# Patient Record
Sex: MALE | URBAN - METROPOLITAN AREA
[De-identification: names, ages, dates, MRNs, and addresses within clinical notes are randomized per-mention and may not be internally consistent; named-entity substitution may affect disease eponyms.]

---

## 2020-03-13 ENCOUNTER — VIRTUAL VISIT (OUTPATIENT)
Dept: FAMILY MEDICINE | Facility: OTHER | Age: 30
End: 2020-03-13

## 2020-03-14 ENCOUNTER — VIRTUAL VISIT (OUTPATIENT)
Dept: FAMILY MEDICINE | Facility: OTHER | Age: 30
End: 2020-03-14

## 2020-03-18 NOTE — PROGRESS NOTES
"Date: 2020 19:38:11  Clinician: Grace Dang  Clinician NPI: [id number]  Patient: Garrett Andrews  Patient : 1990  Patient Address: [address] [place], [place] [address]  Patient Phone: [phone]  Visit Protocol: Ear pain  Patient Summary:  Garrett is a 29 year old ( : 1990 ) male who initiated a Visit for swimmer's ear (ear pain). When asked the question \"Please sign me up to receive news, health information and promotions from [employer].\", Garrett responded \"No\".    Garrett uploaded images of his ear(s).   Garrett reports that his ear pain started 2-4 days ago. The ear pain is located inside the left ear.   In addition to the ear pain, Garrett is experiencing a feeling of fullness in the ear(s). He feels feverish.   Symptom Details     Pain: Garrett is experiencing moderate pain (4-6 on a 10 point pain scale). It does not get worse when he gently pulls on the earlobe(s) and eats or chews.     Temperature: His current temperature is 99.1 degrees Fahrenheit.      Garrett denies tenderness, redness, and itchiness in the ear(s). He also denies recent injuries near the ear(s), having fluid draining from the ear(s), ever having ear tubes, and the possibility of a foreign object in the ear(s).   Precipitating events   Garrett denies swimming and flying within the past week.   Pertinent medical history   Weight: 205 lbs   He does not smoke or use smokeless tobacco.   Additional health information pertinent to this Visit as reported by the patient (free text): Recent hx of otitis media (three in past 12 months).  Fever has come and gone over past five days, usually only lasting a few hours before receding.  Coincident with apparent common cold or possible flu.  Quite a bit of fluid seems built up inside ear.   Weight: 205 lbs  A synchronous phone visit was initiated by the provider for the following reason: patient showing pictures of his ear and facial skin - unclear if he is indicating cellulitis " "   MEDICATIONS: Vitamin D3 oral, fish oil-dha-epa oral, elderberry fruit-elderberry flower oral, ALLERGIES: Sulfa (Sulfonamide Antibiotics)  Clinician Response:  Dear Garrett Tucker,  I am sorry that you are not feeling well.  Based on your history of recurrent middle ear infections, I have prescribed the antibiotic amoxicillin to be taken twice a day for 10 days.&nbsp; You can start this right away.&nbsp;&nbsp;   You can use ibuprofen 400-600 mg up to every 6 hours for pain relief.  If your symptoms do not improve within 48 hours, please be sen in a clinic.  I advise that you speak with your primary care provider about having an ENT (ear, nose, throat) consultation due to the frequent recurrences of inner ear infections.  Please take care and feel better    Diagnosis: Acute serous otitis media, left ear  Diagnosis ICD: H65.02  Triage Notes: I reviewed the patient's history, verified their identity, and explained the Visit process.    Called patient to review photo which was not clear.  He denies redness, warmth or swelling of the skin of the ear or around the ear.  His symptoms are a \"carbon copy\" of his previously diagnosed middle ear infections.  He has successfully treated these with amoxicillin.  Synchronous Triage: phone, status: completed, duration: 128 seconds  Prescription: amoxicillin 875 mg oral tablet 20 tablet, 10 days supply. Take 1 tablet by mouth every 12 hours for 10 days. Refills: 0, Refill as needed: no, Allow substitutions: yes  Pharmacy: [id number] - [phone] - [address] [place] [address]  "

## 2020-03-18 NOTE — PROGRESS NOTES
"Date: 2020 16:01:13  Clinician: July Gotti  Clinician NPI: [id number]  Patient: Garrett Andrews  Patient : 1990  Patient Address: [address] [place], [place] [address]  Patient Phone: [phone]  Visit Protocol: URI  Patient Summary:  Garrett is a 29 year old ( : 1990 ) male who initiated a Visit for COVID-19 (Coronavirus) evaluation and screening. When asked the question \"Please sign me up to receive news, health information and promotions from [employer].\", Garrett responded \"No\".    Garrett states his symptoms started suddenly 3-6 days ago.   His symptoms consist of a cough, nasal congestion, ear pain, a headache, and rhinitis.   Symptom details     Nasal secretions: The color of his mucus is clear, yellow, and white.    Cough: Garrett coughs every 5-10 minutes and his cough is more bothersome at night. Phlegm comes into his throat when he coughs. He does not believe his cough is caused by post-nasal drip. The color of the phlegm is yellow and white.     Headache: He states the headache is mild (1-3 on a 10 point pain scale).      Garrett denies having chills, wheezing, sore throat, fever, teeth pain, malaise, facial pain or pressure, and myalgias. He also denies taking antibiotic medication for the symptoms, having recent facial or sinus surgery in the past 60 days, and double sickening (worsening symptoms after initial improvement). He is not experiencing dyspnea.   Precipitating events  He has not recently been exposed to someone with influenza. Garrett has been in close contact with the following high risk individuals: children under the age of 5.   Pertinent COVID-19 (Coronavirus) information  Garrett has traveled internationally or to the areas where COVID-19 (Coronavirus) is widespread in the last 14 days before the start of his symptoms. Countries or locations traveled as reported by the patient (free text): [place], [place] -- One of the original outbreak areas in the [place]   " Garrett has not had close contact with a laboratory-confirmed positive COVID-19 patient or someone under quarantine for suspected COVID-19 within 14 days of symptom onset.   Garrett is not a healthcare worker or does not work in a healthcare facility.   Pertinent medical history  Garrett had 1 sinus infection within the past year.   Garrett does not need a return to work/school note.   Weight: 205 lbs   Garrett does not smoke or use smokeless tobacco.   Additional information as reported by the patient (free text): Anecdotal history of breathing issues from young age, possible asthma; history of bronchitis   Weight: 205 lbs    MEDICATIONS: Vitamin D3 oral, fish oil-dha-epa oral, elderberry fruit-elderberry flower oral, ALLERGIES: Sulfa (Sulfonamide Antibiotics)  Clinician Response:  Dear Garrett,   Dear Garrett,  Based on the information you have provided, it does not appear you need Coronavirus (COVID-19) testing at this time because you do not have both a fever and cough which are the primary symptoms of Coronavirus. But your possible exposure to Coronavirus in [place] means that we do recommend self-isolation for 14 days from the last day you may have been exposed.   What does this mean?  Isolate Yourself:   Isolate yourself at home.   Do Not allow any visitors  Do Not go to work or school  Do Not go to [facility],  centers, shopping, or other public places.  Do Not shake hands.  Avoid close contact with others (hugging, kissing).   Protect Others:   Cover Your Mouth and Nose with a mask, disposable tissue or wash cloth to avoid spreading germs to others.  Wash your hands and face frequently with soap and water.   If you have not developed a cough with fever by day 15 of isolation you are considered uninfected.  **If you develop a fever, submit a new visit to us so we can arrange curbside COVID testing.**  Thank you for limiting contact with others, wearing a simple mask to cover your cough, practice  good hand hygiene habits and accessing our virtual services where possible to limit the spread of this virus.  For more information about COVID19 and options for caring for yourself at home, please visit the CDC website at https://www.cdc.gov/coronavirus/2019-ncov/about/steps-when-sick.html  For more options for care at [facility], please visit our website at [email or web address]    COVID-19 (Coronavirus) General Information  With the increase in the number of COVID-19 (Coronavirus) cases, we understand you may have some questions. Below is some helpful information on COVID-19 (Coronavirus).  How can I protect myself and others from the COVID-19 (Coronavirus)?  Because there is currently no vaccine to prevent infection, the best way to protect yourself is to avoid being exposed to this virus. Put distance between yourself and other people if COVID-19 (Coronavirus) is spreading in your community. The virus is thought to spread mainly from person-to-person.     Between people who are in close contact with one another (within about 6 about) for prolonged period (10 minutes or longer).    Through respiratory droplets produced when an infected person coughs or sneezes.     The CDC recommends the following additional steps to protect yourself and others:     Wash your hands often with soap and water for at least 20 seconds, especially after blowing your nose, coughing, or sneezing; going to the bathroom; and before eating or preparing food.  Use an alcohol-based hand  that contains at least 60 percent alcohol if soap and water are not available.        Avoid touching your eyes, nose and mouth with unwashed hands.    Avoid close contact with people who are sick.    Stay home when you are sick.    Cover your cough or sneeze with a tissue, then throw the tissue in the trash.    Clean and disinfect frequently touched objects and surfaces.     You can help stop COVID-19 (Coronavirus)  by knowing the signs and symptoms:     Fever    Cough    Shortness of breath     Contact your healthcare provider if   Develop symptoms   AND   Have been in close contact with a person known to have COVID-19 (Coronavirus) or live in or have recently traveled from an area with ongoing spread of COVID-19 (Coronavirus). Call ahead before you go to a doctor's office or emergency room. Tell them about your recent travel and your symptoms.   For the most up to date information, visit the CDC's website.  Steps to help prevent the spread of COVID-19 (Coronavirus) if you are sick  If you are sick with COVID-19 (Coronavirus) or suspect you are infected with the virus that causes COVID-19 (Coronavirus), follow the steps below to help prevent the disease from spreading&nbsp;to people in your home and community.     Stay home except to get medical care. Home isolation may be started in consultation with your healthcare clinician.    Separate yourself from other people and animals in your home.    Call ahead before visiting your doctor if you have a medical appointment.    Wear a facemask when you are around other people.    Cover your cough and sneezes.    Clean your hands often.    Avoid sharing personal household items.    Clean and disinfect frequently touched objects and surfaces everyday.    You will need to have someone drop off medications or household supplies (if needed) at your house without coming inside or in contact with you or others living in your house.    Monitor your symptoms and seek prompt medical care if your illness is worsening (e.g. Difficulty breathing).    Discontinue home isolation only in consultation with your healthcare provider.     For more detailed and up to date information on what to do if you are sick, visit this link: What to Do If You Are Sick With Coronavirus Disease 2019 (COVID-19).  Do I need to be tested for COVID-19 (Coronavirus)?     At this time, the limited number of tests available  "are controlled by the state and local health departments and are being reserved for more seriously ill patients, those with known exposure to confirmed patients, and those with recent travel (within 14 days) to countries with high rates of COVID-19 (Coronavirus).    Decisions on which patients receive testing will be based on the local spread of COVID-19 (Coronavirus) as well as the symptoms. Your healthcare provider will make the final decision on whether you should be tested.    In the meantime, if you have concerns that you may have been exposed, it is reasonable to practice \"social distancing.\"&nbsp; If you are ill with a cold or flu-like illness, please monitor your symptoms and reach out to your healthcare provider if your symptoms worsen.    For more up to date information, visit this link: COVID-19 (Coronavirus) Frequently Asked Questions and Answers.      Diagnosis: Cough  Diagnosis ICD: R05  Additional Clinician Notes: Herbal medicine experts are recommending that people NOT take elderberry extract if they think they could have COVID because of how it stimulates increased cytokines in the body.  They believe there's a connection between \"cytokine storm\" and more serious COVID-19 disease.  Please stop taking your elderberry supplement for now.  "

## 2020-04-05 ENCOUNTER — VIRTUAL VISIT (OUTPATIENT)
Dept: FAMILY MEDICINE | Facility: OTHER | Age: 30
End: 2020-04-05

## 2020-04-05 NOTE — PROGRESS NOTES
"Date: 2020 12:44:23  Clinician: JCARLOS Garcia  Clinician NPI: [id number]  Patient: Garrett Andrews  Patient : 1990  Patient Address: [address] [place] [id number]  Patient Phone: [phone]  Visit Protocol: URI  Patient Summary:  Garrett is a 29 year old ( : 1990 ) male who initiated a Visit for cold, sinus infection, or influenza. When asked the question \"Please sign me up to receive news, health information and promotions from [employer].\", Garrett responded \"No\".    Garrett states his symptoms started suddenly 1 month or more ago. After his symptoms started, they improved and then got worse again.   His symptoms consist of rhinitis, a sore throat, a cough, nasal congestion, malaise, and ear pain.   Symptom details     Nasal secretions: The color of his mucus is clear and white.    Cough: Garrett coughs every 5-10 minutes and his cough is not more bothersome at night. Phlegm comes into his throat when he coughs. He does not believe his cough is caused by post-nasal drip. The color of the phlegm is clear and white.     Sore throat: Garrett reports having mild throat pain (1-3 on a 10 point pain scale), does not have exudate on his tonsils, and can swallow liquids. The lymph nodes in his neck are not enlarged. A rash has not appeared on the skin since the sore throat started.      Garrett denies having facial pain or pressure, myalgias, enlarged lymph nodes, chills, diarrhea, vomiting, nausea, teeth pain, headache, fever, and wheezing. He also denies having recent facial or sinus surgery in the past 60 days. He is not experiencing dyspnea.   Precipitating events  Within the past week, Garrett has not been exposed to someone with strep throat. He has not recently been exposed to someone with influenza. Garrett has been in close contact with the following high risk individuals: children under the age of 5.   Pertinent COVID-19 (Coronavirus) information  Garrett has not traveled " internationally or to the areas where COVID-19 (Coronavirus) is widespread, including cruise ship travel in the last 14 days before the start of his symptoms.   Garrett has not had a close contact with a laboratory-confirmed COVID-19 patient within 14 days of symptom onset. He also has not had a close contact with a suspected COVID-19 patient within 14 days of symptom onset.   Garrett does not work or volunteer as healthcare worker or a  and does not work or volunteer in a healthcare facility. He lives with a healthcare worker.   Pertinent medical history  Garrett has taken an antibiotic medication in the past month. Antibiotic details as reported by the patient (free text): Amoxicillin 875mg twice daily for ten days, about two weeks ago, due to suspected otitis media   Garrett does not need a return to work/school note.   Weight: 195 lbs   Garrett does not smoke or use smokeless tobacco.   Additional information as reported by the patient (free text): Symptoms started week of March 2. Began with mid-grade fever and chills, developed into cough/suspected bronchitis. Persistent elevated temp for past four weeks (99-99.5) during afternoon. Tend to feel run down towards end of the day (5pm onwards) Recurring respiratory congestion from chest to throat, possibly bronchitis but unsure. Hx of bronchitis, exercise-induced asthma. Seeking advice as to whether this requires in-person visit and/or Rx.   Weight: 195 lbs    MEDICATIONS: ascorbic acid-multivitamin-minerals oral, Vitamin D3 oral, fish oil-dha-epa oral, ALLERGIES: Sulfa (Sulfonamide Antibiotics)  Clinician Response:  Dear Garrett,   Based on the information you have provided, further evaluation in urgent care or with your PCP is indicated given the duration of your symptoms.&nbsp;  If you choose your PCP, please call the clinic for an appointment ahead of time to notify them of your symptoms of COVID-19 or&nbsp; you may be seen in one of our  designated urgent care sites that is prepared to see patients who have symptoms that could indicate Coronavirus (Covid-19).  For your information: At this time we will NOT perform coronavirus testing in urgent care sites. This test is currently being reserved for patients who are being admitted to the hospital.  [facility] Locations: [place], [place], [place], [place], [place], [facility], [place]), [place] and [place]  Please call [address] [place] [address] ([phone]) to schedule an urgent care appointment at one of our urgent care or walk in care sites. It is essential that you tell them when you call that you were referred to be scheduled for in-person urgent care appointment by a provider in [facility].      [facility]: If you usually get care or are located in the [facility] area, you can be seen as a walk in without an appointment at the [facility]. This is open from 8AM-8PM on weekdays and 10am-8pm on weekends. The phone number to this clinic is [phone].     PLEASE BRING DOCUMENTATION FROM THIS COMPLETED [facility] VISIT TO YOUR URGENT CARE VISIT.   ----------------------------------------------------------  Medication information  Unless you are allergic to or are taking medications contraindicated to use, I recommend using the over-the-counter medication(s) below:   An antihistamine such as&nbsp;Benadryl, Claritin, or store brand.      A      decongestant such as&nbsp;Sudafed PE or store brand.    Dextromethorphan      (Robitussin DM or store brand). This medication is a cough suppressant      that works by decreasing the feeling of needing to cough. Please follow      the instructions on the package.    Guaifenesin      + dextromethorphan (Robitussin DM, Mucinex DM, or store brand).    Saline      nasal spray (Ocean or store brand). Use 1-2 sprays in each nostril 3 times      a day as needed for congestion.    A      sinus irrigation kit such as&nbsp;Sinus Rinse, Neti  Pot, SinuCleanse, or      store brand. Be sure to use sterile or previously boiled water to prevent      unwanted infections.    Oxymetazoline      (Afrin or store brand) nasal spray. Use 1 spray in each nostril 2 times a      day for a maximum of 3 days. Using this medication more frequently or      longer than recommended may cause nasal congestion to reoccur or worsen.      Sinus pressure occurs when the tissues lining your sinuses become swollen      and inflamed. Afrin nasal spray decreases the swelling to provide the      quickest and most effective relief from sinus pressure.    Over-the-counter medications do not require a prescription. Ask the pharmacist if you have any questions.  Self care  The following tips will keep you as comfortable as possible while you recover:     Rest    Drink      plenty of water and other liquids    Take      a hot shower to loosen congestion    Use      throat lozenges    Gargle      with warm salt water (1/4 teaspoon of salt per 8 ounce glass of water)    Suck      on frozen items such as popsicles or ice cubes    Drink      hot tea with lemon and honey    Take      a spoonful of honey to reduce your cough        For more information about COVID19 and options for caring for yourself at home, please visit the CDC website at https://www.cdc.gov/coronavirus/2019-ncov/about/steps-when-sick.html  For more options for care at [facility], please visit our website at [email or web address]    COVID-19 (Coronavirus) General Information  With the increase in the number of COVID-19 (Coronavirus) cases, we understand you may have some questions. Below is some helpful information on COVID-19 (Coronavirus).  How can I protect myself and others from the COVID-19 (Coronavirus)?  Because there is currently no vaccine to prevent infection, the best way to protect yourself is to avoid being exposed to this virus. Put distance between yourself and other people if  COVID-19 (Coronavirus) is spreading in your community. The virus is thought to spread mainly from person-to-person.     Between people who are in close contact with one another (within about 6 about) for a prolonged period (10 minutes or longer).    Through respiratory droplets produced when an infected person coughs or sneezes.     The CDC recommends the following additional steps to protect yourself and others:     Wash your hands often with soap and water for at least 20 seconds, especially after blowing your nose, coughing, or sneezing; going to the bathroom; and before eating or preparing food.  Use an alcohol-based hand  that contains at least 60 percent alcohol if soap and water are not available.        Avoid touching your eyes, nose and mouth with unwashed hands.    Avoid close contact with people who are sick.    Stay home when you are sick.    Cover your cough or sneeze with a tissue, then throw the tissue in the trash.    Clean and disinfect frequently touched objects and surfaces.     You can help stop COVID-19 (Coronavirus) by knowing the signs and symptoms:     Fever    Cough    Shortness of breath     Contact your healthcare provider if   Develop symptoms   AND   Have been in close contact with a person known to have COVID-19 (Coronavirus) or live in or have recently traveled from an area with ongoing spread of COVID-19 (Coronavirus). Call ahead before you go to a doctor's office or emergency room. Tell them about your recent travel and your symptoms.   For the most up to date information, visit the CDC's website.  Self-monitoring  Self-monitoring means people should monitor themselves for fever by taking their temperatures twice a day and remain alert for a cough or difficulty breathing.  It is important to check your health two times each day for 14 days after a potential exposure to a person with COVID-19 (Coronavirus) or after travel from a location where COVID-19 (Coronavirus) is  widespread. If you have been exposed to a person with COVID-19 (Coronavirus), it may take up to 14 days to know if you will get sick. Follow the steps below to check and record your health.     Take your temperature with a thermometer twice a day, once in the morning and once in the evening, and watch for a cough or difficulty breathing for 14 days.    Write down your temperature and any COVID-19 symptoms you may have: feeling feverish, coughing, or difficulty breathing.    Stay home from work or school.    Do not take public transportation, taxis, or ride-shares.    Avoid crowded places (such as shopping centers and movie theaters) and limit your activities in public.    Keep your distance from others (about 6 feet or 2 meters).    If you get sick with fever, cough, or trouble breathing, contact your healthcare provider and tell them about your recent travel and/or your symptoms.    If you need to seek medical care for other reasons, such as dialysis, call ahead to your doctor and tell them about your recent travel.     Steps to help prevent the spread of COVID-19 (Coronavirus) if you are sick  If you are sick with COVID-19 (Coronavirus) or suspect you are infected with the virus that causes COVID-19 (Coronavirus), follow the steps below to help prevent the disease from spreading&nbsp;to people in your home and community.     Stay home except to get medical care. Home isolation may be started in consultation with your healthcare clinician.    Separate yourself from other people and animals in your home.    Call ahead before visiting your doctor if you have a medical appointment.    Wear a facemask when you are around other people.    Cover your cough and sneezes.    Clean your hands often.    Avoid sharing personal household items.    Clean and disinfect frequently touched objects and surfaces everyday.    You will need to have someone drop off medications or household supplies (if needed) at your house without  "coming inside or in contact with you or others living in your house.    Monitor your symptoms and seek prompt medical care if your illness is worsening (e.g. Difficulty breathing).    Discontinue home isolation only in consultation with your healthcare provider.     For more detailed and up to date information on what to do if you are sick, visit this link: What to Do If You Are Sick With COVID-19.  Do I need to be tested for COVID-19 (Coronavirus)?     Not everyone needs to be tested for COVID-19 (Coronavirus). Decisions on which patients receive testing will be based on the local spread of COVID-19 (Coronavirus) as well as the symptoms. Your healthcare provider will make the final decision on whether you should be tested.    In the meantime, if you have concerns that you may have been exposed, it is reasonable to practice \"social distancing.\"&nbsp; If you are ill with a cold or flu-like illness, please monitor your symptoms and call your healthcare provider if your symptoms worsen.    For more up to date information, visit this link: COVID-19 (Coronavirus) Frequently Asked Questions and Answers.      Diagnosis: Coronavirus infection, unspecified  Diagnosis ICD: B34.2  "

## 2020-04-16 ENCOUNTER — VIRTUAL VISIT (OUTPATIENT)
Dept: FAMILY MEDICINE | Facility: OTHER | Age: 30
End: 2020-04-16

## 2020-04-16 NOTE — PROGRESS NOTES
"Date: 2020 18:44:35  Clinician: Lacey Castro  Clinician NPI: [id number]  Patient: Garrett Andrews  Patient : 1990  Patient Address: [address] [place], [place] [address]  Patient Phone: [phone]  Visit Protocol: General skin conditions  Patient Summary:  Garrett is a 29 year old ( : 1990 ) male who initiated a Visit for evaluation of an unspecified skin condition. When asked the question \"Please sign me up to receive news, health information and promotions from [employer].\", Garrett responded \"No\".    Images of his skin condition were uploaded.  His symptoms started 4-6 days ago and affect the right side of his body. The skin condition is located on his neck. The skin condition is red in color.   The affected area has pimples. It feels warm to touch, tender to touch, and painful. The symptoms do not interfere with his sleep. Garrett also feels feverish.   Symptom details     Redness: The redness has not rapidly increased in size.    Pain: The pain is moderate (between 4-6 on a 10 point pain scale).    Temperature: His current temperature is 99.2 degrees Fahrenheit.      The skin condition has changed since the symptoms started. Description of changes as reported by the patient (free text): Site of normal cebaceous cyst that has recently become irritated and red over past 2-3 days.   Denied symptoms include hives, itchiness, drainage, crusts, blisters, burning, scabs, numbness, dry/flaky skin, and sores. Garrett He does not have a rash in the shape of a bull's-eye.   Treatments or home remedies used to relieve the symptoms as reported by the patient (free text): Have tried both warm wet compress and cold compresses as usual.  Apply cortisone cream to area twice a day.  Cover with bandages whenever practical.   Precipitating events   Garrett did not come in contact with any irritants prior to the onset of his symptoms and has not been in close contact with anyone that has similar symptoms. He " also did not spend time in a wooded area, swim, travel, or spend excess time in the sun just before his symptoms started. Garrett did not get bitten or stung by an insect.   Pertinent medical history  Garrett has experienced this skin condition before. His current skin condition does not come and go. The last time he experienced this skin condition was within the last 3 months.   Garrett has had chickenpox, but has not had shingles in the past.   Garrett has a history of seasonal allergies or hay fever. He has ongoing medical conditions. Ongoing medical conditions as reported by the patient (free text): Cystic acne since puberty (about 20 years).    Garrett does not need a return to work/school note.   Weight: 195 lbs   Garrett does not smoke or use smokeless tobacco.   Additional information as reported by the patient (free text): This is a regular occurrence that runs in the family.  I have had a number of large cysts removed over the years.  This one is at a tricky spot on my neck along my shirt line, which is probably why it became irritated.  Normal practice has been to treat cysts at home until they drain on their own or subside over time.  This one seems to be more severe and legitimately infected -- low fever has developed in past 12 hours and site is red and warm to touch.   Weight: 195 lbs    MEDICATIONS: ascorbic acid-multivitamin-minerals oral, Vitamin D3 oral, fish oil-dha-epa oral, ALLERGIES: Sulfa (Sulfonamide Antibiotics)  Clinician Response:  Dear Garrett,   Your health is our priority. To determine the most appropriate care for you, I would like you to be seen in person to further discuss your health history and symptoms.  You will not be charged for this Visit. Thank you for trusting us with your care.   Diagnosis: Refer for additional evaluation  Diagnosis ICD: R69

## 2021-12-17 ENCOUNTER — APPOINTMENT (OUTPATIENT)
Dept: URBAN - METROPOLITAN AREA CLINIC 253 | Age: 31
Setting detail: DERMATOLOGY
End: 2021-12-17

## 2021-12-17 VITALS — RESPIRATION RATE: 15 BRPM | HEIGHT: 72 IN | WEIGHT: 205 LBS

## 2021-12-17 DIAGNOSIS — L70.0 ACNE VULGARIS: ICD-10-CM

## 2021-12-17 DIAGNOSIS — L72.8 OTHER FOLLICULAR CYSTS OF THE SKIN AND SUBCUTANEOUS TISSUE: ICD-10-CM

## 2021-12-17 PROCEDURE — OTHER MIPS QUALITY: OTHER

## 2021-12-17 PROCEDURE — OTHER ADDITIONAL NOTES: OTHER

## 2021-12-17 PROCEDURE — 99202 OFFICE O/P NEW SF 15 MIN: CPT

## 2021-12-17 PROCEDURE — OTHER COUNSELING: OTHER

## 2021-12-17 ASSESSMENT — LOCATION ZONE DERM
LOCATION ZONE: FACE
LOCATION ZONE: TRUNK
LOCATION ZONE: NECK

## 2021-12-17 ASSESSMENT — LOCATION SIMPLE DESCRIPTION DERM
LOCATION SIMPLE: LEFT SUBMANDIBULAR AREA
LOCATION SIMPLE: POSTERIOR NECK
LOCATION SIMPLE: CHEST
LOCATION SIMPLE: UPPER BACK
LOCATION SIMPLE: RIGHT UPPER BACK

## 2021-12-17 ASSESSMENT — LOCATION DETAILED DESCRIPTION DERM
LOCATION DETAILED: RIGHT LATERAL SUPERIOR CHEST
LOCATION DETAILED: SUPERIOR THORACIC SPINE
LOCATION DETAILED: RIGHT SUPERIOR UPPER BACK
LOCATION DETAILED: LEFT SUBMANDIBULAR AREA
LOCATION DETAILED: RIGHT POSTERIOR NECK
LOCATION DETAILED: RIGHT LATERAL NECK

## 2021-12-17 NOTE — PROCEDURE: COUNSELING
Doxycycline Counseling:  Patient counseled regarding possible photosensitivity and increased risk for sunburn.  Patient instructed to avoid sunlight, if possible.  When exposed to sunlight, patients should wear protective clothing, sunglasses, and sunscreen.  The patient was instructed to call the office immediately if the following severe adverse effects occur:  hearing changes, easy bruising/bleeding, severe headache, or vision changes.  The patient verbalized understanding of the proper use and possible adverse effects of doxycycline.  All of the patient's questions and concerns were addressed.
Bactrim Counseling:  I discussed with the patient the risks of sulfa antibiotics including but not limited to GI upset, allergic reaction, drug rash, diarrhea, dizziness, photosensitivity, and yeast infections.  Rarely, more serious reactions can occur including but not limited to aplastic anemia, agranulocytosis, methemoglobinemia, blood dyscrasias, liver or kidney failure, lung infiltrates or desquamative/blistering drug rashes.
Birth Control Pills Counseling: Birth Control Pill Counseling: I discussed with the patient the potential side effects of OCPs including but not limited to increased risk of stroke, heart attack, thrombophlebitis, deep venous thrombosis, hepatic adenomas, breast changes, GI upset, headaches, and depression.  The patient verbalized understanding of the proper use and possible adverse effects of OCPs. All of the patient's questions and concerns were addressed.
Spironolactone Pregnancy And Lactation Text: This medication can cause feminization of the male fetus and should be avoided during pregnancy. The active metabolite is also found in breast milk.
Doxycycline Pregnancy And Lactation Text: This medication is Pregnancy Category D and not consider safe during pregnancy. It is also excreted in breast milk but is considered safe for shorter treatment courses.
Minocycline Pregnancy And Lactation Text: This medication is Pregnancy Category D and not consider safe during pregnancy. It is also excreted in breast milk.
Topical Sulfur Applications Pregnancy And Lactation Text: This medication is Pregnancy Category C and has an unknown safety profile during pregnancy. It is unknown if this topical medication is excreted in breast milk.
Topical Sulfur Applications Counseling: Topical Sulfur Counseling: Patient counseled that this medication may cause skin irritation or allergic reactions.  In the event of skin irritation, the patient was advised to reduce the amount of the drug applied or use it less frequently.   The patient verbalized understanding of the proper use and possible adverse effects of topical sulfur application.  All of the patient's questions and concerns were addressed.
Erythromycin Pregnancy And Lactation Text: This medication is Pregnancy Category B and is considered safe during pregnancy. It is also excreted in breast milk.
Isotretinoin Counseling: Patient should get monthly blood tests, not donate blood, not drive at night if vision affected, not share medication, and not undergo elective surgery for 6 months after tx completed. Side effects reviewed, pt to contact office should one occur.
Minocycline Counseling: Patient advised regarding possible photosensitivity and discoloration of the teeth, skin, lips, tongue and gums.  Patient instructed to avoid sunlight, if possible.  When exposed to sunlight, patients should wear protective clothing, sunglasses, and sunscreen.  The patient was instructed to call the office immediately if the following severe adverse effects occur:  hearing changes, easy bruising/bleeding, severe headache, or vision changes.  The patient verbalized understanding of the proper use and possible adverse effects of minocycline.  All of the patient's questions and concerns were addressed.
Topical Clindamycin Pregnancy And Lactation Text: This medication is Pregnancy Category B and is considered safe during pregnancy. It is unknown if it is excreted in breast milk.
High Dose Vitamin A Counseling: Side effects reviewed, pt to contact office should one occur.
Detail Level: Detailed
Topical Retinoid counseling:  Patient advised to apply a pea-sized amount only at bedtime and wait 30 minutes after washing their face before applying.  If too drying, patient may add a non-comedogenic moisturizer. The patient verbalized understanding of the proper use and possible adverse effects of retinoids.  All of the patient's questions and concerns were addressed.
Azithromycin Pregnancy And Lactation Text: This medication is considered safe during pregnancy and is also secreted in breast milk.
Azithromycin Counseling:  I discussed with the patient the risks of azithromycin including but not limited to GI upset, allergic reaction, drug rash, diarrhea, and yeast infections.
Include Pregnancy/Lactation Warning?: No
Detail Level: Simple
Bactrim Pregnancy And Lactation Text: This medication is Pregnancy Category D and is known to cause fetal risk.  It is also excreted in breast milk.
Sarecycline Counseling: Patient advised regarding possible photosensitivity and discoloration of the teeth, skin, lips, tongue and gums.  Patient instructed to avoid sunlight, if possible.  When exposed to sunlight, patients should wear protective clothing, sunglasses, and sunscreen.  The patient was instructed to call the office immediately if the following severe adverse effects occur:  hearing changes, easy bruising/bleeding, severe headache, or vision changes.  The patient verbalized understanding of the proper use and possible adverse effects of sarecycline.  All of the patient's questions and concerns were addressed.
High Dose Vitamin A Pregnancy And Lactation Text: High dose vitamin A therapy is contraindicated during pregnancy and breast feeding.
Tazorac Counseling:  Patient advised that medication is irritating and drying.  Patient may need to apply sparingly and wash off after an hour before eventually leaving it on overnight.  The patient verbalized understanding of the proper use and possible adverse effects of tazorac.  All of the patient's questions and concerns were addressed.
Dapsone Pregnancy And Lactation Text: This medication is Pregnancy Category C and is not considered safe during pregnancy or breast feeding.
Benzoyl Peroxide Counseling: Patient counseled that medicine may cause skin irritation and bleach clothing.  In the event of skin irritation, the patient was advised to reduce the amount of the drug applied or use it less frequently.   The patient verbalized understanding of the proper use and possible adverse effects of benzoyl peroxide.  All of the patient's questions and concerns were addressed.
Benzoyl Peroxide Pregnancy And Lactation Text: This medication is Pregnancy Category C. It is unknown if benzoyl peroxide is excreted in breast milk.
Isotretinoin Pregnancy And Lactation Text: This medication is Pregnancy Category X and is considered extremely dangerous during pregnancy. It is unknown if it is excreted in breast milk.
Tazorac Pregnancy And Lactation Text: This medication is not safe during pregnancy. It is unknown if this medication is excreted in breast milk.
Erythromycin Counseling:  I discussed with the patient the risks of erythromycin including but not limited to GI upset, allergic reaction, drug rash, diarrhea, increase in liver enzymes, and yeast infections.
Tetracycline Counseling: Patient counseled regarding possible photosensitivity and increased risk for sunburn.  Patient instructed to avoid sunlight, if possible.  When exposed to sunlight, patients should wear protective clothing, sunglasses, and sunscreen.  The patient was instructed to call the office immediately if the following severe adverse effects occur:  hearing changes, easy bruising/bleeding, severe headache, or vision changes.  The patient verbalized understanding of the proper use and possible adverse effects of tetracycline.  All of the patient's questions and concerns were addressed. Patient understands to avoid pregnancy while on therapy due to potential birth defects.
Dapsone Counseling: I discussed with the patient the risks of dapsone including but not limited to hemolytic anemia, agranulocytosis, rashes, methemoglobinemia, kidney failure, peripheral neuropathy, headaches, GI upset, and liver toxicity.  Patients who start dapsone require monitoring including baseline LFTs and weekly CBCs for the first month, then every month thereafter.  The patient verbalized understanding of the proper use and possible adverse effects of dapsone.  All of the patient's questions and concerns were addressed.
Spironolactone Counseling: Patient advised regarding risks of diarrhea, abdominal pain, hyperkalemia, birth defects (for female patients), liver toxicity and renal toxicity. The patient may need blood work to monitor liver and kidney function and potassium levels while on therapy. The patient verbalized understanding of the proper use and possible adverse effects of spironolactone.  All of the patient's questions and concerns were addressed.
Topical Retinoid Pregnancy And Lactation Text: This medication is Pregnancy Category C. It is unknown if this medication is excreted in breast milk.
Topical Clindamycin Counseling: Patient counseled that this medication may cause skin irritation or allergic reactions.  In the event of skin irritation, the patient was advised to reduce the amount of the drug applied or use it less frequently.   The patient verbalized understanding of the proper use and possible adverse effects of clindamycin.  All of the patient's questions and concerns were addressed.
Birth Control Pills Pregnancy And Lactation Text: This medication should be avoided if pregnant and for the first 30 days post-partum.

## 2021-12-17 NOTE — PROCEDURE: ADDITIONAL NOTES
Detail Level: Simple
Render Risk Assessment In Note?: no
Additional Notes: Recommend a benzoyl peroxide wash to help prevent further cysts from forming.\\n\\nA clinical assistant was present for the exam. Care instructions were explained to the patient in detail. Told patient to call with any concerns or questions. The patient verbalized understanding and agreement of the education provided and the treatment plan. Encouraged patient to schedule a follow up appointment right after visit. At the end of the visit, all questions had been answered and the patient was satisfied with the visit.
Additional Notes: Discussed removing the chest and back with me, but would defer to Dr. ashton for the anterior and posterior neck lesions.

## 2021-12-17 NOTE — HPI: EVALUATION OF SKIN LESION(S)
What Type Of Note Output Would You Prefer (Optional)?: Standard Output
Have Your Spot(S) Been Treated In The Past?: has not been treated
Hpi Title: Evaluation of Skin Lesions
Additional History: Cysts he would like evaluated. He has a large one around his jaw that’s been there maybe almost 8 years, chest has been there maybe 20 years. It was smaller and is now growing. The ones on his back are stable, he has had others that will drain or refill. He is tired of them and ready to discuss having them removed.

## 2022-01-03 ENCOUNTER — APPOINTMENT (OUTPATIENT)
Dept: URBAN - METROPOLITAN AREA CLINIC 256 | Age: 32
Setting detail: DERMATOLOGY
End: 2022-01-03

## 2022-01-03 DIAGNOSIS — L72.8 OTHER FOLLICULAR CYSTS OF THE SKIN AND SUBCUTANEOUS TISSUE: ICD-10-CM

## 2022-01-03 PROCEDURE — 11446 EXC FACE-MM B9+MARG >4 CM: CPT

## 2022-01-03 PROCEDURE — 11442 EXC FACE-MM B9+MARG 1.1-2 CM: CPT

## 2022-01-03 PROCEDURE — OTHER EXCISION: OTHER

## 2022-01-03 PROCEDURE — OTHER COUNSELING: OTHER

## 2022-01-03 PROCEDURE — 12051 INTMD RPR FACE/MM 2.5 CM/<: CPT

## 2022-01-03 ASSESSMENT — LOCATION ZONE DERM: LOCATION ZONE: FACE

## 2022-01-03 ASSESSMENT — LOCATION SIMPLE DESCRIPTION DERM
LOCATION SIMPLE: SUBMENTAL CHIN
LOCATION SIMPLE: LEFT SUBMANDIBULAR AREA

## 2022-01-03 ASSESSMENT — LOCATION DETAILED DESCRIPTION DERM
LOCATION DETAILED: SUBMENTAL CHIN
LOCATION DETAILED: LEFT SUBMANDIBULAR AREA

## 2022-01-03 NOTE — PROCEDURE: EXCISION
Flexeril - Kayla    appt 4/20  Labs 12/19   Complex Repair And Modified Advancement Flap Text: The defect edges were debeveled with a #15 scalpel blade.  The primary defect was closed partially with a complex linear closure.  Given the location of the remaining defect, shape of the defect and the proximity to free margins a modified advancement flap was deemed most appropriate for complete closure of the defect.  Using a sterile surgical marker, an appropriate advancement flap was drawn incorporating the defect and placing the expected incisions within the relaxed skin tension lines where possible.    The area thus outlined was incised deep to adipose tissue with a #15 scalpel blade.  The skin margins were undermined to an appropriate distance in all directions utilizing iris scissors.

## 2022-01-03 NOTE — PROCEDURE: EXCISION
Thank you,  Selena Sanabria - Pharmacy Technician  [facility]  [phone]     Deep Sutures: 5-0 PGA

## 2022-01-03 NOTE — HPI: SKIN LESION
What Type Of Note Output Would You Prefer (Optional)?: Standard Output
How Severe Is Your Skin Lesion?: moderate
Has Your Skin Lesion Been Treated?: not been treated
Is This A New Presentation, Or A Follow-Up?: Growth
Additional History: Patient was seen on 12/17/2021 and excision of cyst discussed and was deferred to be done by [facility].

## 2022-01-03 NOTE — PROCEDURE: EXCISION
Medical Necessity Clause: This procedure was medically necessary because the lesion that was treated was: enlarging and at risk for reinfection
